# Patient Record
Sex: FEMALE | Race: WHITE | URBAN - METROPOLITAN AREA
[De-identification: names, ages, dates, MRNs, and addresses within clinical notes are randomized per-mention and may not be internally consistent; named-entity substitution may affect disease eponyms.]

---

## 2023-02-27 ENCOUNTER — OFFICE VISIT (OUTPATIENT)
Dept: URBAN - METROPOLITAN AREA CLINIC 7 | Facility: CLINIC | Age: 78
End: 2023-02-27
Payer: MEDICARE

## 2023-02-27 DIAGNOSIS — H59.033 CYSTOID MACULAR EDEMA FOLLOWING CATARACT SURGERY, BILATERAL: Primary | ICD-10-CM

## 2023-02-27 DIAGNOSIS — H35.373 PUCKERING OF MACULA, BILATERAL: ICD-10-CM

## 2023-02-27 DIAGNOSIS — Z96.1 PRESENCE OF INTRAOCULAR LENS: ICD-10-CM

## 2023-02-27 PROCEDURE — 99203 OFFICE O/P NEW LOW 30 MIN: CPT | Performed by: STUDENT IN AN ORGANIZED HEALTH CARE EDUCATION/TRAINING PROGRAM

## 2023-02-27 PROCEDURE — 92134 CPTRZ OPH DX IMG PST SGM RTA: CPT | Performed by: STUDENT IN AN ORGANIZED HEALTH CARE EDUCATION/TRAINING PROGRAM

## 2023-02-27 ASSESSMENT — INTRAOCULAR PRESSURE
OD: 12
OS: 14

## 2023-02-27 NOTE — IMPRESSION/PLAN
Impression: Cystoid macular edema following cataract surgery, bilateral: H59.033. 
OCT: mild ERM OU, no edema OU Plan: -CME noted at outside office OU, now resolved on PF QID and prolensa q day OU
-taper PF 3/2/1/stop OU
-continue prolensa q day OU

RTC: 1 month DFE OU / OCT OU

## 2023-02-27 NOTE — IMPRESSION/PLAN
Impression: Puckering of macula, bilateral: H35.373. Plan: -Minimally visually significant.  Observe for now

## 2023-03-24 ENCOUNTER — OFFICE VISIT (OUTPATIENT)
Dept: URBAN - METROPOLITAN AREA CLINIC 7 | Facility: CLINIC | Age: 78
End: 2023-03-24
Payer: MEDICARE

## 2023-03-24 DIAGNOSIS — Z96.1 PRESENCE OF INTRAOCULAR LENS: ICD-10-CM

## 2023-03-24 DIAGNOSIS — D31.31 BENIGN NEOPLASM OF RIGHT CHOROID: ICD-10-CM

## 2023-03-24 DIAGNOSIS — H35.373 PUCKERING OF MACULA, BILATERAL: ICD-10-CM

## 2023-03-24 DIAGNOSIS — H59.033 CYSTOID MACULAR EDEMA FOLLOWING CATARACT SURGERY, BILATERAL: Primary | ICD-10-CM

## 2023-03-24 PROCEDURE — 99213 OFFICE O/P EST LOW 20 MIN: CPT | Performed by: STUDENT IN AN ORGANIZED HEALTH CARE EDUCATION/TRAINING PROGRAM

## 2023-03-24 PROCEDURE — 92134 CPTRZ OPH DX IMG PST SGM RTA: CPT | Performed by: STUDENT IN AN ORGANIZED HEALTH CARE EDUCATION/TRAINING PROGRAM

## 2023-03-24 ASSESSMENT — INTRAOCULAR PRESSURE
OS: 15
OD: 12

## 2023-03-24 NOTE — IMPRESSION/PLAN
Impression: Cystoid macular edema following cataract surgery, bilateral: H59.033. OCT: mild ERM OU, no edema OU Plan: -CME noted at outside office OU, now resolved with no recurrence (tapered PF and prolensa q day)
-stop gtts and check again in 2 months RTC: 2 months DFE OU / OCT OU

## 2023-03-24 NOTE — IMPRESSION/PLAN
Impression: Presence of intraocular lens: Z96.1.  Plan: -considering YAG OU after her upcoming travel

## 2023-03-24 NOTE — IMPRESSION/PLAN
Impression: Benign neoplasm of right choroid: D31.31. Plan: -Benign appearing, no concerning features for malignancy
-Discussed small risk of progression into malignant melanoma
-Recommend annual dilated exams
